# Patient Record
Sex: MALE | Race: WHITE | NOT HISPANIC OR LATINO | Employment: UNEMPLOYED | URBAN - METROPOLITAN AREA
[De-identification: names, ages, dates, MRNs, and addresses within clinical notes are randomized per-mention and may not be internally consistent; named-entity substitution may affect disease eponyms.]

---

## 2023-05-10 ENCOUNTER — HOSPITAL ENCOUNTER (INPATIENT)
Facility: MEDICAL CENTER | Age: 53
LOS: 1 days | DRG: 378 | End: 2023-05-11
Attending: STUDENT IN AN ORGANIZED HEALTH CARE EDUCATION/TRAINING PROGRAM | Admitting: STUDENT IN AN ORGANIZED HEALTH CARE EDUCATION/TRAINING PROGRAM
Payer: COMMERCIAL

## 2023-05-10 DIAGNOSIS — F10.10 ALCOHOL ABUSE: ICD-10-CM

## 2023-05-10 DIAGNOSIS — K29.61 EROSIVE GASTRITIS WITH HEMORRHAGE: ICD-10-CM

## 2023-05-10 DIAGNOSIS — K92.2 UPPER GI BLEED: ICD-10-CM

## 2023-05-10 DIAGNOSIS — K92.0 HEMATEMESIS, UNSPECIFIED WHETHER NAUSEA PRESENT: ICD-10-CM

## 2023-05-10 LAB
ALBUMIN SERPL BCP-MCNC: 3.9 G/DL (ref 3.2–4.9)
ALBUMIN/GLOB SERPL: 1.7 G/DL
ALP SERPL-CCNC: 65 U/L (ref 30–99)
ALT SERPL-CCNC: 16 U/L (ref 2–50)
ANION GAP SERPL CALC-SCNC: 10 MMOL/L (ref 7–16)
APPEARANCE UR: CLEAR
AST SERPL-CCNC: 14 U/L (ref 12–45)
BASOPHILS # BLD AUTO: 0.3 % (ref 0–1.8)
BASOPHILS # BLD: 0.03 K/UL (ref 0–0.12)
BILIRUB SERPL-MCNC: 0.6 MG/DL (ref 0.1–1.5)
BILIRUB UR QL STRIP.AUTO: NEGATIVE
BUN SERPL-MCNC: 27 MG/DL (ref 8–22)
CALCIUM ALBUM COR SERPL-MCNC: 8.7 MG/DL (ref 8.5–10.5)
CALCIUM SERPL-MCNC: 8.6 MG/DL (ref 8.5–10.5)
CHLORIDE SERPL-SCNC: 111 MMOL/L (ref 96–112)
CO2 SERPL-SCNC: 20 MMOL/L (ref 20–33)
COLOR UR: YELLOW
CREAT SERPL-MCNC: 0.79 MG/DL (ref 0.5–1.4)
EOSINOPHIL # BLD AUTO: 0.04 K/UL (ref 0–0.51)
EOSINOPHIL NFR BLD: 0.3 % (ref 0–6.9)
ERYTHROCYTE [DISTWIDTH] IN BLOOD BY AUTOMATED COUNT: 49.3 FL (ref 35.9–50)
GFR SERPLBLD CREATININE-BSD FMLA CKD-EPI: 106 ML/MIN/1.73 M 2
GLOBULIN SER CALC-MCNC: 2.3 G/DL (ref 1.9–3.5)
GLUCOSE SERPL-MCNC: 108 MG/DL (ref 65–99)
GLUCOSE UR STRIP.AUTO-MCNC: NEGATIVE MG/DL
HCT VFR BLD AUTO: 45.7 % (ref 42–52)
HGB BLD-MCNC: 15.4 G/DL (ref 14–18)
IMM GRANULOCYTES # BLD AUTO: 0.05 K/UL (ref 0–0.11)
IMM GRANULOCYTES NFR BLD AUTO: 0.4 % (ref 0–0.9)
INR PPP: 1.1 (ref 0.87–1.13)
KETONES UR STRIP.AUTO-MCNC: 15 MG/DL
LACTATE SERPL-SCNC: 1.8 MMOL/L (ref 0.5–2)
LEUKOCYTE ESTERASE UR QL STRIP.AUTO: NEGATIVE
LYMPHOCYTES # BLD AUTO: 3.2 K/UL (ref 1–4.8)
LYMPHOCYTES NFR BLD: 27.1 % (ref 22–41)
MCH RBC QN AUTO: 32.7 PG (ref 27–33)
MCHC RBC AUTO-ENTMCNC: 33.7 G/DL (ref 33.7–35.3)
MCV RBC AUTO: 97 FL (ref 81.4–97.8)
MICRO URNS: ABNORMAL
MONOCYTES # BLD AUTO: 0.67 K/UL (ref 0–0.85)
MONOCYTES NFR BLD AUTO: 5.7 % (ref 0–13.4)
NEUTROPHILS # BLD AUTO: 7.82 K/UL (ref 1.82–7.42)
NEUTROPHILS NFR BLD: 66.2 % (ref 44–72)
NITRITE UR QL STRIP.AUTO: NEGATIVE
NRBC # BLD AUTO: 0 K/UL
NRBC BLD-RTO: 0 /100 WBC
PH UR STRIP.AUTO: 5.5 [PH] (ref 5–8)
PLATELET # BLD AUTO: 162 K/UL (ref 164–446)
PMV BLD AUTO: 9.8 FL (ref 9–12.9)
POTASSIUM SERPL-SCNC: 5.3 MMOL/L (ref 3.6–5.5)
PROT SERPL-MCNC: 6.2 G/DL (ref 6–8.2)
PROT UR QL STRIP: NEGATIVE MG/DL
PROTHROMBIN TIME: 14.1 SEC (ref 12–14.6)
RBC # BLD AUTO: 4.71 M/UL (ref 4.7–6.1)
RBC UR QL AUTO: NEGATIVE
SODIUM SERPL-SCNC: 141 MMOL/L (ref 135–145)
SP GR UR STRIP.AUTO: 1.02
TROPONIN T SERPL-MCNC: 8 NG/L (ref 6–19)
UROBILINOGEN UR STRIP.AUTO-MCNC: 0.2 MG/DL
WBC # BLD AUTO: 11.8 K/UL (ref 4.8–10.8)

## 2023-05-10 PROCEDURE — 700111 HCHG RX REV CODE 636 W/ 250 OVERRIDE (IP): Performed by: STUDENT IN AN ORGANIZED HEALTH CARE EDUCATION/TRAINING PROGRAM

## 2023-05-10 PROCEDURE — 99285 EMERGENCY DEPT VISIT HI MDM: CPT

## 2023-05-10 PROCEDURE — 99223 1ST HOSP IP/OBS HIGH 75: CPT | Performed by: STUDENT IN AN ORGANIZED HEALTH CARE EDUCATION/TRAINING PROGRAM

## 2023-05-10 PROCEDURE — 96366 THER/PROPH/DIAG IV INF ADDON: CPT

## 2023-05-10 PROCEDURE — 96368 THER/DIAG CONCURRENT INF: CPT

## 2023-05-10 PROCEDURE — 36415 COLL VENOUS BLD VENIPUNCTURE: CPT

## 2023-05-10 PROCEDURE — 96365 THER/PROPH/DIAG IV INF INIT: CPT

## 2023-05-10 PROCEDURE — 770020 HCHG ROOM/CARE - TELE (206)

## 2023-05-10 PROCEDURE — 85025 COMPLETE CBC W/AUTO DIFF WBC: CPT

## 2023-05-10 PROCEDURE — 80053 COMPREHEN METABOLIC PANEL: CPT

## 2023-05-10 PROCEDURE — 84484 ASSAY OF TROPONIN QUANT: CPT

## 2023-05-10 PROCEDURE — 700105 HCHG RX REV CODE 258: Performed by: STUDENT IN AN ORGANIZED HEALTH CARE EDUCATION/TRAINING PROGRAM

## 2023-05-10 PROCEDURE — C9113 INJ PANTOPRAZOLE SODIUM, VIA: HCPCS | Performed by: STUDENT IN AN ORGANIZED HEALTH CARE EDUCATION/TRAINING PROGRAM

## 2023-05-10 PROCEDURE — 85610 PROTHROMBIN TIME: CPT

## 2023-05-10 PROCEDURE — 81003 URINALYSIS AUTO W/O SCOPE: CPT

## 2023-05-10 PROCEDURE — 83605 ASSAY OF LACTIC ACID: CPT

## 2023-05-10 RX ORDER — ONDANSETRON 4 MG/1
4 TABLET, ORALLY DISINTEGRATING ORAL EVERY 4 HOURS PRN
Status: DISCONTINUED | OUTPATIENT
Start: 2023-05-10 | End: 2023-05-11 | Stop reason: HOSPADM

## 2023-05-10 RX ORDER — PROCHLORPERAZINE EDISYLATE 5 MG/ML
5-10 INJECTION INTRAMUSCULAR; INTRAVENOUS EVERY 4 HOURS PRN
Status: DISCONTINUED | OUTPATIENT
Start: 2023-05-10 | End: 2023-05-11 | Stop reason: HOSPADM

## 2023-05-10 RX ORDER — ONDANSETRON 2 MG/ML
4 INJECTION INTRAMUSCULAR; INTRAVENOUS EVERY 4 HOURS PRN
Status: DISCONTINUED | OUTPATIENT
Start: 2023-05-10 | End: 2023-05-11 | Stop reason: HOSPADM

## 2023-05-10 RX ORDER — PROMETHAZINE HYDROCHLORIDE 25 MG/1
12.5-25 SUPPOSITORY RECTAL EVERY 4 HOURS PRN
Status: DISCONTINUED | OUTPATIENT
Start: 2023-05-10 | End: 2023-05-11 | Stop reason: HOSPADM

## 2023-05-10 RX ORDER — GAUZE BANDAGE 2" X 2"
100 BANDAGE TOPICAL DAILY
Status: DISCONTINUED | OUTPATIENT
Start: 2023-05-11 | End: 2023-05-11

## 2023-05-10 RX ORDER — PROMETHAZINE HYDROCHLORIDE 25 MG/1
12.5-25 TABLET ORAL EVERY 4 HOURS PRN
Status: DISCONTINUED | OUTPATIENT
Start: 2023-05-10 | End: 2023-05-11 | Stop reason: HOSPADM

## 2023-05-10 RX ORDER — FOLIC ACID 1 MG/1
1 TABLET ORAL DAILY
Status: DISCONTINUED | OUTPATIENT
Start: 2023-05-11 | End: 2023-05-11

## 2023-05-10 RX ADMIN — PANTOPRAZOLE SODIUM 8 MG/HR: 40 INJECTION, POWDER, FOR SOLUTION INTRAVENOUS at 22:23

## 2023-05-10 RX ADMIN — OCTREOTIDE ACETATE 50 MCG/HR: 200 INJECTION, SOLUTION INTRAVENOUS; SUBCUTANEOUS at 22:26

## 2023-05-11 ENCOUNTER — ANESTHESIA EVENT (OUTPATIENT)
Dept: SURGERY | Facility: MEDICAL CENTER | Age: 53
DRG: 378 | End: 2023-05-11
Payer: COMMERCIAL

## 2023-05-11 ENCOUNTER — PHARMACY VISIT (OUTPATIENT)
Dept: PHARMACY | Facility: MEDICAL CENTER | Age: 53
End: 2023-05-11
Payer: COMMERCIAL

## 2023-05-11 ENCOUNTER — ANESTHESIA (OUTPATIENT)
Dept: SURGERY | Facility: MEDICAL CENTER | Age: 53
DRG: 378 | End: 2023-05-11
Payer: COMMERCIAL

## 2023-05-11 VITALS
HEART RATE: 102 BPM | WEIGHT: 223.55 LBS | DIASTOLIC BLOOD PRESSURE: 70 MMHG | RESPIRATION RATE: 19 BRPM | SYSTOLIC BLOOD PRESSURE: 104 MMHG | HEIGHT: 71 IN | BODY MASS INDEX: 31.3 KG/M2 | TEMPERATURE: 98.2 F | OXYGEN SATURATION: 94 %

## 2023-05-11 LAB
CFT BLD TEG: 5.3 MIN (ref 4.6–9.1)
CFT P HPASE BLD TEG: 7.1 MIN (ref 4.3–8.3)
CLOT ANGLE BLD TEG: 61.8 DEGREES (ref 63–78)
CT.EXTRINSIC BLD ROTEM: 2.3 MIN (ref 0.8–2.1)
HGB BLD-MCNC: 14.7 G/DL (ref 14–18)
HGB BLD-MCNC: 15.3 G/DL (ref 14–18)
MAGNESIUM SERPL-MCNC: 2 MG/DL (ref 1.5–2.5)
MCF BLD TEG: 51 MM (ref 52–69)
MCF.PLATELET INHIB BLD ROTEM: 16.7 MM (ref 15–32)
PHOSPHATE SERPL-MCNC: 3.1 MG/DL (ref 2.5–4.5)
TEG ALGORITHM TGALG: ABNORMAL

## 2023-05-11 PROCEDURE — 700111 HCHG RX REV CODE 636 W/ 250 OVERRIDE (IP): Performed by: STUDENT IN AN ORGANIZED HEALTH CARE EDUCATION/TRAINING PROGRAM

## 2023-05-11 PROCEDURE — 99239 HOSP IP/OBS DSCHRG MGMT >30: CPT | Performed by: STUDENT IN AN ORGANIZED HEALTH CARE EDUCATION/TRAINING PROGRAM

## 2023-05-11 PROCEDURE — 700101 HCHG RX REV CODE 250: Performed by: STUDENT IN AN ORGANIZED HEALTH CARE EDUCATION/TRAINING PROGRAM

## 2023-05-11 PROCEDURE — 160048 HCHG OR STATISTICAL LEVEL 1-5: Performed by: INTERNAL MEDICINE

## 2023-05-11 PROCEDURE — 85347 COAGULATION TIME ACTIVATED: CPT

## 2023-05-11 PROCEDURE — 36415 COLL VENOUS BLD VENIPUNCTURE: CPT

## 2023-05-11 PROCEDURE — 160035 HCHG PACU - 1ST 60 MINS PHASE I: Performed by: INTERNAL MEDICINE

## 2023-05-11 PROCEDURE — 85018 HEMOGLOBIN: CPT

## 2023-05-11 PROCEDURE — 43235 EGD DIAGNOSTIC BRUSH WASH: CPT | Performed by: INTERNAL MEDICINE

## 2023-05-11 PROCEDURE — 700105 HCHG RX REV CODE 258: Performed by: STUDENT IN AN ORGANIZED HEALTH CARE EDUCATION/TRAINING PROGRAM

## 2023-05-11 PROCEDURE — 00731 ANES UPR GI NDSC PX NOS: CPT | Performed by: ANESTHESIOLOGY

## 2023-05-11 PROCEDURE — 160002 HCHG RECOVERY MINUTES (STAT): Performed by: INTERNAL MEDICINE

## 2023-05-11 PROCEDURE — 700105 HCHG RX REV CODE 258: Performed by: INTERNAL MEDICINE

## 2023-05-11 PROCEDURE — 160202 HCHG ENDO MINUTES - 1ST 30 MINS LEVEL 3: Performed by: INTERNAL MEDICINE

## 2023-05-11 PROCEDURE — A9270 NON-COVERED ITEM OR SERVICE: HCPCS | Performed by: STUDENT IN AN ORGANIZED HEALTH CARE EDUCATION/TRAINING PROGRAM

## 2023-05-11 PROCEDURE — 99223 1ST HOSP IP/OBS HIGH 75: CPT | Performed by: NURSE PRACTITIONER

## 2023-05-11 PROCEDURE — 700105 HCHG RX REV CODE 258: Performed by: ANESTHESIOLOGY

## 2023-05-11 PROCEDURE — 85576 BLOOD PLATELET AGGREGATION: CPT

## 2023-05-11 PROCEDURE — 0DJ08ZZ INSPECTION OF UPPER INTESTINAL TRACT, VIA NATURAL OR ARTIFICIAL OPENING ENDOSCOPIC: ICD-10-PCS | Performed by: INTERNAL MEDICINE

## 2023-05-11 PROCEDURE — 83735 ASSAY OF MAGNESIUM: CPT

## 2023-05-11 PROCEDURE — 84100 ASSAY OF PHOSPHORUS: CPT

## 2023-05-11 PROCEDURE — 700111 HCHG RX REV CODE 636 W/ 250 OVERRIDE (IP): Performed by: ANESTHESIOLOGY

## 2023-05-11 PROCEDURE — 96366 THER/PROPH/DIAG IV INF ADDON: CPT

## 2023-05-11 PROCEDURE — 700102 HCHG RX REV CODE 250 W/ 637 OVERRIDE(OP): Performed by: STUDENT IN AN ORGANIZED HEALTH CARE EDUCATION/TRAINING PROGRAM

## 2023-05-11 PROCEDURE — RXMED WILLOW AMBULATORY MEDICATION CHARGE: Performed by: STUDENT IN AN ORGANIZED HEALTH CARE EDUCATION/TRAINING PROGRAM

## 2023-05-11 PROCEDURE — 85384 FIBRINOGEN ACTIVITY: CPT

## 2023-05-11 PROCEDURE — C9113 INJ PANTOPRAZOLE SODIUM, VIA: HCPCS | Performed by: STUDENT IN AN ORGANIZED HEALTH CARE EDUCATION/TRAINING PROGRAM

## 2023-05-11 PROCEDURE — 160009 HCHG ANES TIME/MIN: Performed by: INTERNAL MEDICINE

## 2023-05-11 RX ORDER — HYDROMORPHONE HYDROCHLORIDE 1 MG/ML
0.1 INJECTION, SOLUTION INTRAMUSCULAR; INTRAVENOUS; SUBCUTANEOUS
Status: DISCONTINUED | OUTPATIENT
Start: 2023-05-11 | End: 2023-05-11 | Stop reason: HOSPADM

## 2023-05-11 RX ORDER — LORAZEPAM 2 MG/ML
1 INJECTION INTRAMUSCULAR
Status: DISCONTINUED | OUTPATIENT
Start: 2023-05-11 | End: 2023-05-11 | Stop reason: HOSPADM

## 2023-05-11 RX ORDER — LORAZEPAM 1 MG/1
3 TABLET ORAL
Status: DISCONTINUED | OUTPATIENT
Start: 2023-05-11 | End: 2023-05-11 | Stop reason: HOSPADM

## 2023-05-11 RX ORDER — EPHEDRINE SULFATE 50 MG/ML
5 INJECTION, SOLUTION INTRAVENOUS
Status: DISCONTINUED | OUTPATIENT
Start: 2023-05-11 | End: 2023-05-11 | Stop reason: HOSPADM

## 2023-05-11 RX ORDER — LIDOCAINE HYDROCHLORIDE 10 MG/ML
INJECTION, SOLUTION EPIDURAL; INFILTRATION; INTRACAUDAL; PERINEURAL PRN
Status: DISCONTINUED | OUTPATIENT
Start: 2023-05-11 | End: 2023-05-11 | Stop reason: SURG

## 2023-05-11 RX ORDER — SODIUM CHLORIDE, SODIUM LACTATE, POTASSIUM CHLORIDE, CALCIUM CHLORIDE 600; 310; 30; 20 MG/100ML; MG/100ML; MG/100ML; MG/100ML
INJECTION, SOLUTION INTRAVENOUS CONTINUOUS
Status: ACTIVE | OUTPATIENT
Start: 2023-05-11 | End: 2023-05-11

## 2023-05-11 RX ORDER — GAUZE BANDAGE 2" X 2"
100 BANDAGE TOPICAL DAILY
Status: DISCONTINUED | OUTPATIENT
Start: 2023-05-12 | End: 2023-05-11 | Stop reason: HOSPADM

## 2023-05-11 RX ORDER — LORAZEPAM 1 MG/1
1 TABLET ORAL EVERY 4 HOURS PRN
Status: DISCONTINUED | OUTPATIENT
Start: 2023-05-11 | End: 2023-05-11 | Stop reason: HOSPADM

## 2023-05-11 RX ORDER — LORAZEPAM 1 MG/1
4 TABLET ORAL
Status: DISCONTINUED | OUTPATIENT
Start: 2023-05-11 | End: 2023-05-11 | Stop reason: HOSPADM

## 2023-05-11 RX ORDER — LORAZEPAM 2 MG/ML
2 INJECTION INTRAMUSCULAR
Status: DISCONTINUED | OUTPATIENT
Start: 2023-05-11 | End: 2023-05-11 | Stop reason: HOSPADM

## 2023-05-11 RX ORDER — OMEPRAZOLE 20 MG/1
20 CAPSULE, DELAYED RELEASE ORAL 2 TIMES DAILY
Qty: 120 CAPSULE | Refills: 0 | Status: SHIPPED | OUTPATIENT
Start: 2023-05-12 | End: 2023-07-11

## 2023-05-11 RX ORDER — HYDROMORPHONE HYDROCHLORIDE 1 MG/ML
0.2 INJECTION, SOLUTION INTRAMUSCULAR; INTRAVENOUS; SUBCUTANEOUS
Status: DISCONTINUED | OUTPATIENT
Start: 2023-05-11 | End: 2023-05-11 | Stop reason: HOSPADM

## 2023-05-11 RX ORDER — FOLIC ACID 1 MG/1
1 TABLET ORAL DAILY
Status: DISCONTINUED | OUTPATIENT
Start: 2023-05-12 | End: 2023-05-11 | Stop reason: HOSPADM

## 2023-05-11 RX ORDER — LORAZEPAM 2 MG/ML
1.5 INJECTION INTRAMUSCULAR
Status: DISCONTINUED | OUTPATIENT
Start: 2023-05-11 | End: 2023-05-11 | Stop reason: HOSPADM

## 2023-05-11 RX ORDER — LORAZEPAM 2 MG/ML
1 INJECTION INTRAMUSCULAR
Status: DISCONTINUED | OUTPATIENT
Start: 2023-05-11 | End: 2023-05-11

## 2023-05-11 RX ORDER — LORAZEPAM 2 MG/ML
0.5 INJECTION INTRAMUSCULAR EVERY 4 HOURS PRN
Status: DISCONTINUED | OUTPATIENT
Start: 2023-05-11 | End: 2023-05-11 | Stop reason: HOSPADM

## 2023-05-11 RX ORDER — HYDROMORPHONE HYDROCHLORIDE 1 MG/ML
0.4 INJECTION, SOLUTION INTRAMUSCULAR; INTRAVENOUS; SUBCUTANEOUS
Status: DISCONTINUED | OUTPATIENT
Start: 2023-05-11 | End: 2023-05-11 | Stop reason: HOSPADM

## 2023-05-11 RX ORDER — LORAZEPAM 1 MG/1
0.5 TABLET ORAL EVERY 4 HOURS PRN
Status: DISCONTINUED | OUTPATIENT
Start: 2023-05-11 | End: 2023-05-11 | Stop reason: HOSPADM

## 2023-05-11 RX ORDER — SODIUM CHLORIDE, SODIUM LACTATE, POTASSIUM CHLORIDE, CALCIUM CHLORIDE 600; 310; 30; 20 MG/100ML; MG/100ML; MG/100ML; MG/100ML
INJECTION, SOLUTION INTRAVENOUS CONTINUOUS
Status: DISCONTINUED | OUTPATIENT
Start: 2023-05-11 | End: 2023-05-11 | Stop reason: HOSPADM

## 2023-05-11 RX ORDER — ONDANSETRON 2 MG/ML
4 INJECTION INTRAMUSCULAR; INTRAVENOUS
Status: DISCONTINUED | OUTPATIENT
Start: 2023-05-11 | End: 2023-05-11 | Stop reason: HOSPADM

## 2023-05-11 RX ORDER — OMEPRAZOLE 20 MG/1
20 CAPSULE, DELAYED RELEASE ORAL 2 TIMES DAILY
Status: DISCONTINUED | OUTPATIENT
Start: 2023-05-11 | End: 2023-05-11 | Stop reason: HOSPADM

## 2023-05-11 RX ORDER — SODIUM CHLORIDE, SODIUM LACTATE, POTASSIUM CHLORIDE, CALCIUM CHLORIDE 600; 310; 30; 20 MG/100ML; MG/100ML; MG/100ML; MG/100ML
INJECTION, SOLUTION INTRAVENOUS
Status: DISCONTINUED | OUTPATIENT
Start: 2023-05-11 | End: 2023-05-11 | Stop reason: SURG

## 2023-05-11 RX ORDER — DIPHENHYDRAMINE HYDROCHLORIDE 50 MG/ML
12.5 INJECTION INTRAMUSCULAR; INTRAVENOUS
Status: DISCONTINUED | OUTPATIENT
Start: 2023-05-11 | End: 2023-05-11 | Stop reason: HOSPADM

## 2023-05-11 RX ORDER — LORAZEPAM 1 MG/1
2 TABLET ORAL
Status: DISCONTINUED | OUTPATIENT
Start: 2023-05-11 | End: 2023-05-11 | Stop reason: HOSPADM

## 2023-05-11 RX ORDER — HYDRALAZINE HYDROCHLORIDE 20 MG/ML
5 INJECTION INTRAMUSCULAR; INTRAVENOUS
Status: DISCONTINUED | OUTPATIENT
Start: 2023-05-11 | End: 2023-05-11 | Stop reason: HOSPADM

## 2023-05-11 RX ORDER — MEPERIDINE HYDROCHLORIDE 25 MG/ML
6.25 INJECTION INTRAMUSCULAR; INTRAVENOUS; SUBCUTANEOUS
Status: DISCONTINUED | OUTPATIENT
Start: 2023-05-11 | End: 2023-05-11 | Stop reason: HOSPADM

## 2023-05-11 RX ORDER — LABETALOL HYDROCHLORIDE 5 MG/ML
5 INJECTION, SOLUTION INTRAVENOUS
Status: DISCONTINUED | OUTPATIENT
Start: 2023-05-11 | End: 2023-05-11 | Stop reason: HOSPADM

## 2023-05-11 RX ORDER — ATORVASTATIN CALCIUM 20 MG/1
20 TABLET, FILM COATED ORAL
Status: ON HOLD | COMMUNITY
End: 2023-05-11

## 2023-05-11 RX ORDER — OXYCODONE HCL 5 MG/5 ML
10 SOLUTION, ORAL ORAL
Status: DISCONTINUED | OUTPATIENT
Start: 2023-05-11 | End: 2023-05-11 | Stop reason: HOSPADM

## 2023-05-11 RX ORDER — OXYCODONE HCL 5 MG/5 ML
5 SOLUTION, ORAL ORAL
Status: DISCONTINUED | OUTPATIENT
Start: 2023-05-11 | End: 2023-05-11 | Stop reason: HOSPADM

## 2023-05-11 RX ADMIN — OMEPRAZOLE 20 MG: 20 CAPSULE, DELAYED RELEASE ORAL at 17:04

## 2023-05-11 RX ADMIN — PANTOPRAZOLE SODIUM 8 MG/HR: 40 INJECTION, POWDER, FOR SOLUTION INTRAVENOUS at 08:46

## 2023-05-11 RX ADMIN — PROPOFOL 20 MG: 10 INJECTION, EMULSION INTRAVENOUS at 10:02

## 2023-05-11 RX ADMIN — THIAMINE HYDROCHLORIDE: 100 INJECTION, SOLUTION INTRAMUSCULAR; INTRAVENOUS at 15:13

## 2023-05-11 RX ADMIN — PROPOFOL 20 MG: 10 INJECTION, EMULSION INTRAVENOUS at 10:03

## 2023-05-11 RX ADMIN — PROPOFOL 20 MG: 10 INJECTION, EMULSION INTRAVENOUS at 10:01

## 2023-05-11 RX ADMIN — PROPOFOL 100 MG: 10 INJECTION, EMULSION INTRAVENOUS at 10:00

## 2023-05-11 RX ADMIN — SODIUM CHLORIDE, POTASSIUM CHLORIDE, SODIUM LACTATE AND CALCIUM CHLORIDE: 600; 310; 30; 20 INJECTION, SOLUTION INTRAVENOUS at 09:28

## 2023-05-11 RX ADMIN — LIDOCAINE HYDROCHLORIDE 50 MG: 10 INJECTION, SOLUTION EPIDURAL; INFILTRATION; INTRACAUDAL at 10:00

## 2023-05-11 RX ADMIN — FENTANYL CITRATE 25 MCG: 50 INJECTION, SOLUTION INTRAMUSCULAR; INTRAVENOUS at 09:57

## 2023-05-11 RX ADMIN — SODIUM CHLORIDE, POTASSIUM CHLORIDE, SODIUM LACTATE AND CALCIUM CHLORIDE: 600; 310; 30; 20 INJECTION, SOLUTION INTRAVENOUS at 09:55

## 2023-05-11 ASSESSMENT — FIBROSIS 4 INDEX: FIB4 SCORE: 1.15

## 2023-05-11 ASSESSMENT — COGNITIVE AND FUNCTIONAL STATUS - GENERAL
SUGGESTED CMS G CODE MODIFIER DAILY ACTIVITY: CH
DAILY ACTIVITIY SCORE: 24
SUGGESTED CMS G CODE MODIFIER MOBILITY: CH
MOBILITY SCORE: 24

## 2023-05-11 ASSESSMENT — ENCOUNTER SYMPTOMS
NAUSEA: 1
SHORTNESS OF BREATH: 0
HEARTBURN: 0
FEVER: 0
BRUISES/BLEEDS EASILY: 0
WEAKNESS: 0
ABDOMINAL PAIN: 0
BLOOD IN STOOL: 0
CONSTIPATION: 0
COUGH: 0
DEPRESSION: 0
DOUBLE VISION: 0
CHILLS: 0
DIARRHEA: 0
NAUSEA: 0
BLURRED VISION: 0
MYALGIAS: 0
HEMOPTYSIS: 0
PALPITATIONS: 0
NECK PAIN: 0
BACK PAIN: 0
VOMITING: 1
ABDOMINAL PAIN: 1
DIZZINESS: 0
HEADACHES: 0

## 2023-05-11 ASSESSMENT — PAIN DESCRIPTION - PAIN TYPE
TYPE: SURGICAL PAIN

## 2023-05-11 ASSESSMENT — LIFESTYLE VARIABLES
HAVE PEOPLE ANNOYED YOU BY CRITICIZING YOUR DRINKING: YES
EVER FELT BAD OR GUILTY ABOUT YOUR DRINKING: NO
HEADACHE, FULLNESS IN HEAD: NOT PRESENT
TOTAL SCORE: 3
TOTAL SCORE: 3
ANXIETY: MILDLY ANXIOUS
HOW MANY TIMES IN THE PAST YEAR HAVE YOU HAD 5 OR MORE DRINKS IN A DAY: 20
VISUAL DISTURBANCES: NOT PRESENT
ALCOHOL_USE: YES
AUDITORY DISTURBANCES: NOT PRESENT
HAVE YOU EVER FELT YOU SHOULD CUT DOWN ON YOUR DRINKING: YES
SUBSTANCE_ABUSE: 1
PAROXYSMAL SWEATS: NO SWEAT VISIBLE
EVER HAD A DRINK FIRST THING IN THE MORNING TO STEADY YOUR NERVES TO GET RID OF A HANGOVER: YES
AVERAGE NUMBER OF DAYS PER WEEK YOU HAVE A DRINK CONTAINING ALCOHOL: 7
TOTAL SCORE: 1
CONSUMPTION TOTAL: POSITIVE
AGITATION: NORMAL ACTIVITY
ORIENTATION AND CLOUDING OF SENSORIUM: ORIENTED AND CAN DO SERIAL ADDITIONS
DOES PATIENT WANT TO STOP DRINKING: NO
NAUSEA AND VOMITING: NO NAUSEA AND NO VOMITING
TREMOR: NO TREMOR
TOTAL SCORE: 3
ON A TYPICAL DAY WHEN YOU DRINK ALCOHOL HOW MANY DRINKS DO YOU HAVE: 3

## 2023-05-11 ASSESSMENT — PATIENT HEALTH QUESTIONNAIRE - PHQ9
1. LITTLE INTEREST OR PLEASURE IN DOING THINGS: NOT AT ALL
SUM OF ALL RESPONSES TO PHQ9 QUESTIONS 1 AND 2: 0
2. FEELING DOWN, DEPRESSED, IRRITABLE, OR HOPELESS: NOT AT ALL

## 2023-05-11 NOTE — ANESTHESIA PREPROCEDURE EVALUATION
Case: [id number] Date/Time: 05/11/23 1346    Procedure: GASTROSCOPY    Location: [facility] ROOM 26 / SURGERY SAME DAY [facility]    Surgeons: Kee Amin M.D.          Relevant Problems   Other   (positive) Alcohol abuse   (positive) Obesity (BMI 30.0-34.9)   (positive) Upper GI bleed     Last hematemesis yesterday evening.  Physical Exam    Airway   Mallampati: III  TM distance: >3 FB  Neck ROM: full       Cardiovascular - normal exam  Rhythm: regular  Rate: normal  (-) murmur     Dental - normal exam           Pulmonary - normal exam  Breath sounds clear to auscultation     Abdominal    Neurological - normal exam                 Anesthesia Plan    ASA 2       Plan - MAC         (GETA if necessary)      Induction: intravenous      Pertinent diagnostic labs and testing reviewed    Informed Consent:    Anesthetic plan and risks discussed with patient.    Use of blood products discussed with: patient whom consented to blood products.

## 2023-05-11 NOTE — PROGRESS NOTES
Hospital Medicine Daily Progress Note    Date of Service  5/11/2023    Chief Complaint  Sonny Fitch is a 52 y.o. male admitted 5/10/2023 with GI bleed    Hospital Course  Sonny Fitch is a 52 y.o. male past medical history of lower back pain, alcohol abuse who presented 5/10/2023 with vomiting blood at outside facility and episode of black tarry stool that he noticed this morning.  Patient reports he drinks half a pint of Captrenny Nielsen every day.  He reports never having bloody vomiting in the past.  He does report using ibuprofen 2-3 times weekly for chronic lower back pain as opiates make him itchy.  At outside facility he was given 2 units of PRBCs, started on Protonix and octreotide.  Patient transferred here for higher level of care.     In the ER at [facility] his blood pressure remained hemodynamically stable.  Repeat hemoglobin returned at 15.  Currently no further episodes of hematemesis or black tarry stools.  GI was consulted by ER physician recommended endoscopy in a.m.  He will be admitted to medicine service.    Interval Problem Update  5/11/2023:  Patient underwent upper endoscopy today findings were significant for LA grade B esophagitis at the GE junction no esophageal varices however.  Furthermore, patient also had diffuse erythema, erythema and also erosive changes with old blood in the stomach this was highly suggestive of erosive gastritis.  This bleed possibly secondary to chronic alcohol use.  There is no active bleeding no hemostasis was need to be performed.  Recommendations were appreciated.  Patient will continue on oral PPI twice daily for 60 days.  Additionally patient will be placed in  regards to alcohol cessation.  Furthermore there is strong suspicion that patient may be going through early stages of active alcohol withdrawal CIWA protocol has been ordered.  Repeat labs in the morning continue to trend hemoglobin of the course the evening.    I  have discussed this patient's plan of care and discharge plan at IDT rounds today with Case Management, Nursing, Nursing leadership, and other members of the IDT team.    Consultants/Specialty  GI    Code Status  Full Code    Disposition  The patient is not medically cleared for discharge to home or a post-acute facility.      I have placed the appropriate orders for post-discharge needs.    Review of Systems  Review of Systems   Constitutional:  Negative for chills and fever.   HENT:  Negative for hearing loss and tinnitus.    Eyes:  Negative for blurred vision and double vision.   Respiratory:  Negative for cough and hemoptysis.    Cardiovascular:  Negative for chest pain and palpitations.   Gastrointestinal:  Positive for abdominal pain, nausea and vomiting. Negative for heartburn.   Genitourinary:  Negative for dysuria and urgency.   Musculoskeletal:  Negative for myalgias and neck pain.   Skin:  Negative for rash.   Neurological:  Negative for dizziness and headaches.   Endo/Heme/Allergies:  Does not bruise/bleed easily.   Psychiatric/Behavioral:  Negative for depression and suicidal ideas.         Physical Exam  Temp:  [36.6 °C (97.8 °F)-36.8 °C (98.3 °F)] 36.6 °C (97.8 °F)  Pulse:  [] 88  Resp:  [11-26] 16  BP: ()/(51-72) 100/60  SpO2:  [90 %-98 %] 98 %    Physical Exam  Vitals and nursing note reviewed.   Constitutional:       Appearance: Normal appearance.   HENT:      Head: Normocephalic and atraumatic.      Right Ear: Tympanic membrane normal.      Left Ear: Tympanic membrane normal.      Nose: Nose normal.      Mouth/Throat:      Mouth: Mucous membranes are dry.      Pharynx: Oropharynx is clear.   Eyes:      Extraocular Movements: Extraocular movements intact.      Pupils: Pupils are equal, round, and reactive to light.   Cardiovascular:      Rate and Rhythm: Normal rate and regular rhythm.      Pulses: Normal pulses.      Heart sounds: Normal heart sounds.   Pulmonary:      Effort:  Pulmonary effort is normal.      Breath sounds: Normal breath sounds.   Abdominal:      General: Bowel sounds are normal. There is no distension.      Palpations: Abdomen is soft. There is no mass.   Musculoskeletal:         General: Normal range of motion.      Cervical back: Neck supple.   Skin:     General: Skin is warm.      Capillary Refill: Capillary refill takes less than 2 seconds.   Neurological:      General: No focal deficit present.      Mental Status: He is alert and oriented to person, place, and time. Mental status is at baseline.   Psychiatric:         Mood and Affect: Mood normal.         Behavior: Behavior normal.         Fluids    Intake/Output Summary (Last 24 hours) at 5/11/2023 1259  Last data filed at 5/11/2023 1010  Gross per 24 hour   Intake 200 ml   Output --   Net 200 ml       Laboratory  Recent Labs     05/10/23  2147 05/11/23  0521   WBC 11.8*  --    RBC 4.71  --    HEMOGLOBIN 15.4 14.7   HEMATOCRIT 45.7  --    MCV 97.0  --    MCH 32.7  --    MCHC 33.7  --    RDW 49.3  --    PLATELETCT 162*  --    MPV 9.8  --      Recent Labs     05/10/23  2147   SODIUM 141   POTASSIUM 5.3   CHLORIDE 111   CO2 20   GLUCOSE 108*   BUN 27*   CREATININE 0.79   CALCIUM 8.6     Recent Labs     05/10/23  2147   INR 1.10               Imaging  No orders to display        Assessment/Plan  * Upper GI bleed- (present on admission)  Assessment & Plan  Patient status post upper endoscopy evidence of erosive gastritis with evidence of old blood status post bleed in addition to distal esophagitis at GE junction.  No hemostasis was required no evidence of active bleeding.  Patient continue on omeprazole 20 mg twice daily for 60 days.  Alcohol cessation ibuprofen cessation ibuprofen     Erosive esophagitis  Assessment & Plan  No intervention required continue with alcohol cessation continue with omeprazole 20 mg twice daily for 6 weeks.    Erosive gastritis with hemorrhage  Assessment & Plan  Patient had evidence of  erosive gastritis in addition to diffuse erythema on the gastric surface however no evidence of active bleeding no hemostasis was required.  Evidence of old blood was present.  Duodenum complete normal.  Plan:  1.  Omeprazole 20 mg twice daily for 60 days  2.  Continue to avoid alcohol.    Obesity (BMI 30.0-34.9)  Assessment & Plan  BMI 33.47       Alcohol abuse  Assessment & Plan  Reports never being hospitalized for Etoh withdrawal   Check magnesium   Thiamine, folic acid and multivitamins        Please note that this dictation was created using voice recognition software. I have made every reasonable attempt to correct obvious errors, but I expect that there are errors of grammar and possibly context that I did not discover before finalizing the note.      Greater than 51 minutes spent prepping to see patient (e.g. review of tests) obtaining and/or reviewing separately obtained history. Performing a medically appropriate examination and/ evaluation.  Counseling and educating the patient/family/caregiver.  Ordering medications, tests, or procedures.  Referring and communicating with other health care professionals.  Documenting clinical information in EPIC.  Independently interpreting results and communicating results to patient/family/caregiver.  Care coordination.    VTE prophylaxis: SCDs/TEDs and pharmacologic prophylaxis contraindicated due to recent GI bleed    I have performed a physical exam and reviewed and updated ROS and Plan today (5/11/2023). In review of yesterday's note (5/10/2023), there are no changes except as documented above.

## 2023-05-11 NOTE — ED NOTES
"Unable to obtain med rec at this time    Patient stated he uses [facility], in [place]- however Im unable to locate a pharmacy by that name. Unable to confirm if he meant [facility].   Patient states he takes something for cholesterol, but he doesn't know what and kept repeating \"I dont know\" when I tried to ask further questions.     On interviewing patient, patient refused to talk to me about medications further, patient was aggravated and hostile demanding I find someone to go over his admission paperwork with him. When I stated I was not qualified to discuss his paperwork and would find someone for him, he started shaking it at me and insisting I find a supervisor and not the PARs who had previously assisted him.     I did find PAR Senior, Jones, and asked Jones to speak with him about his paperwork.   "

## 2023-05-11 NOTE — ED TRIAGE NOTES
"Chief Complaint   Patient presents with    Upper GI Bleed     Patient to [facility] via ems from [facility], diagnosed with upper gi bleed txa, 2PRBC, 80mg protonix, 1g rocephin. Patient presented to [place] for dizziness and 2 days blood in vomit. Patient was found to be hypotensive 60/40s and proceeded to vomit 1000mls lyssa red blood.      BP (!) 88/55   Pulse 93   Temp 36.8 °C (98.3 °F) (Temporal)   Resp 18   Ht 1.803 m (5' 11\")   Wt 109 kg (240 lb)   SpO2 96%     "

## 2023-05-11 NOTE — H&P
Hospital Medicine History & Physical Note    Date of Service  5/10/2023    Primary Care Physician  Pcp Pt States None    Consultants  GI    Specialist Names: Dr. Amin     Code Status  Full Code    Chief Complaint  Chief Complaint   Patient presents with    Upper GI Bleed     Patient to [facility] via ems from [facility], diagnosed with upper gi bleed txa, 2PRBC, 80mg protonix, 1g rocephin. Patient presented to [place] for dizziness and 2 days blood in vomit. Patient was found to be hypotensive 60/40s and proceeded to vomit 1000mls lyssa red blood.        History of Presenting Illness  Sonny Fitch is a 52 y.o. male past medical history of lower back pain, alcohol abuse who presented 5/10/2023 with vomiting blood at outside facility and episode of black tarry stool that he noticed this morning.  Patient reports he drinks half a pint of [employer] [place] every day.  He reports never having bloody vomiting in the past.  He does report using ibuprofen 2-3 times weekly for chronic lower back pain as opiates make him itchy.  At outside facility he was given 2 units of PRBCs, started on Protonix and octreotide.  Patient transferred here for higher level of care.    In the ER at [facility] his blood pressure remained hemodynamically stable.  Repeat hemoglobin returned at 15.  Currently no further episodes of hematemesis or black tarry stools.  GI was consulted by ER physician recommended endoscopy in a.m.  He will be admitted to medicine service.    I discussed the plan of care with patient, bedside RN, and ER physician. .    Review of Systems  Review of Systems   Constitutional:  Negative for chills and fever.   HENT:  Negative for hearing loss and tinnitus.    Eyes:  Negative for blurred vision and double vision.   Respiratory:  Negative for cough and hemoptysis.    Cardiovascular:  Negative for chest pain and palpitations.   Gastrointestinal:  Positive for abdominal pain, nausea and vomiting.  Negative for heartburn.   Genitourinary:  Negative for dysuria and urgency.   Musculoskeletal:  Negative for myalgias and neck pain.   Skin:  Negative for rash.   Neurological:  Negative for dizziness and headaches.   Endo/Heme/Allergies:  Does not bruise/bleed easily.   Psychiatric/Behavioral:  Negative for depression and suicidal ideas.        Past Medical History   has a past medical history of Hyperlipidemia.    Surgical History   has a past surgical history that includes irrigation & debridement ortho (7/14/08); external fixator application (7/14/08); and orif, fracture, tibia (7/15/08).     Family History  family history is not on file.   Family history reviewed with patient. There is no family history that is pertinent to the chief complaint.     Social History   reports that he has been smoking cigarettes. He has been smoking an average of 1 pack per day. He has never used smokeless tobacco. He reports current alcohol use of about 9.0 oz of alcohol per week. He reports that he does not use drugs.    Allergies  Allergies   Allergen Reactions    Morphine Itching    Opium Itching       Medications  None       Physical Exam  Temp:  [36.8 °C (98.3 °F)] 36.8 °C (98.3 °F)  Pulse:  [] 86  Resp:  [14-22] 20  BP: ()/(52-72) 91/52  SpO2:  [90 %-97 %] 91 %  Blood Pressure: 91/52   Temperature: 36.8 °C (98.3 °F)   Pulse: 86   Respiration: 20   Pulse Oximetry: 91 %       Physical Exam  Vitals and nursing note reviewed.   Constitutional:       Appearance: Normal appearance.   HENT:      Head: Normocephalic and atraumatic.      Right Ear: Tympanic membrane normal.      Left Ear: Tympanic membrane normal.      Nose: Nose normal.      Mouth/Throat:      Mouth: Mucous membranes are dry.      Pharynx: Oropharynx is clear.   Eyes:      Extraocular Movements: Extraocular movements intact.      Pupils: Pupils are equal, round, and reactive to light.   Cardiovascular:      Rate and Rhythm: Normal rate and regular  rhythm.      Pulses: Normal pulses.      Heart sounds: Normal heart sounds.   Pulmonary:      Effort: Pulmonary effort is normal.      Breath sounds: Normal breath sounds.   Abdominal:      General: Bowel sounds are normal. There is no distension.      Palpations: Abdomen is soft. There is no mass.   Musculoskeletal:         General: Normal range of motion.      Cervical back: Neck supple.   Skin:     General: Skin is warm.      Capillary Refill: Capillary refill takes less than 2 seconds.   Neurological:      General: No focal deficit present.      Mental Status: He is alert and oriented to person, place, and time. Mental status is at baseline.   Psychiatric:         Mood and Affect: Mood normal.         Behavior: Behavior normal.         Laboratory:  Recent Labs     05/10/23  2147 05/11/23  0521   WBC 11.8*  --    RBC 4.71  --    HEMOGLOBIN 15.4 14.7   HEMATOCRIT 45.7  --    MCV 97.0  --    MCH 32.7  --    MCHC 33.7  --    RDW 49.3  --    PLATELETCT 162*  --    MPV 9.8  --      Recent Labs     05/10/23  2147   SODIUM 141   POTASSIUM 5.3   CHLORIDE 111   CO2 20   GLUCOSE 108*   BUN 27*   CREATININE 0.79   CALCIUM 8.6     Recent Labs     05/10/23  2147   ALTSGPT 16   ASTSGOT 14   ALKPHOSPHAT 65   TBILIRUBIN 0.6   GLUCOSE 108*     Recent Labs     05/10/23  2147   INR 1.10     No results for input(s): NTPROBNP in the last 72 hours.      Recent Labs     05/10/23  2147   TROPONINT 8       Imaging:  No orders to display       no X-Ray or EKG requiring interpretation    Assessment/Plan:  Justification for Admission Status  I anticipate this patient will require at least two midnights for appropriate medical management, necessitating inpatient admission because upper GI bleed requiring Serial H/H, PPI, gtt and GI eval in am/    Patient will need a Telemetry bed on MEDICAL service .  The need is secondary to see above.    * Upper GI bleed- (present on admission)  Assessment & Plan  Uses ibuprofen combined with etoh highly  suspect Upper GI Bleed   Protonix and Octeotide, gtt  Serial H/H  He received 2 units of PRBC at outside facility  Transfuse < 7   Avoid NSAID's and Anticoagulants     GI consulted plan for Scope in am     Obesity (BMI 30.0-34.9)  Assessment & Plan  BMI 33.47       Alcohol abuse  Assessment & Plan  Reports never being hospitalized for Etoh withdrawal   Check magnesium   Thiamine, folic acid and multivitamins         VTE prophylaxis: SCDs/TEDs and pharmacologic prophylaxis contraindicated due to GI Bleed

## 2023-05-11 NOTE — OR NURSING
1010- Pt to PACU from Endo. Report from anesthesia and Endo RN. On 6L O2 via mask. Respirations even and unlabored. VSS.      1020- Pt awake. Declines pain and nausea at this time. Awaiting bed assignment.    1150- Pt up to bathroom via gurney. Able to void without difficulty.    1219- Ok per Dr. Joy for patient to resume pre-op diet as tolerated    1258- PT tolerating sips of water    1338 - Report to bedside RN.  Pt taken back to floor with RN escort on monitor.  VSS, has all belongings with him.

## 2023-05-11 NOTE — OP REPORT
OPERATIVE REPORT    PATIENT:   Sonny Fitch   1970       PREOPERATIVE DIAGNOSES/INDICATIONS: Hematemesis    POSTOPERATIVE DIAGNOSIS: 1.  There was evidence of LA grade B esophagitis at the GE junction but no esophageal varices or any other pathology.  2.  Diffuse erythema, edema and erosive changes with old blood in the stomach suggestive of erosive gastritis.  Possibly alcohol-related.  No active bleeding noted.  No distinct bleeding site that needed to be hemostased.  3.  Normal duodenum.    PROCEDURE:  ESOPHAGOGASTRODUODENOSCOPY    PHYSICIAN:  Kee Amin MD    ANESTHESIA:  Per anesthesiologist.    LOCATION: [facility]    CONSENT:  OBTAINED. The risks, benefits and alternatives of the procedure were discussed in details. The risks include and are not limited to bleeding, infection, perforation, missed lesions, and sedations risks (cardiopulmonary compromise and allergic reaction to medications).    DESCRIPTION: The patient presented to the procedure room.  After routine checkup was performed, patient was brought into the endoscopy suite.  Patient was placed on his left lateral decubitus position. A bite block was placed in patient's mouth. Patient was sedated by anesthesia.  Vital signs were monitored throughout the procedure.  Oxygenation support was provided throughout the procedure. Upper endoscope was inserted into patient's mouth and advanced to the second portion of the duodenum under direct visualization.  Once the site was reached and examined, the upper endoscope was withdrawn.  Retroflexion was made within the stomach.  The stomach was decompressed, scope was withdrawn and the procedure was terminated.     The patient tolerated the procedure well.  There were no immediate complications.      RECOMMENDATIONS:  1.  Alcohol cessation.  2.  Twice daily PPI.  3.  No further inpatient intervention needed at this time.  Okay to discharge from GI standpoint if tolerating a diet.    This note has been  transcribed with digital voice recognition software and although it has been reviewed may contain grammatical or word errors

## 2023-05-11 NOTE — PROGRESS NOTES
4 Eyes Skin Assessment Completed by MONTANA bustamante and MONTANA montoya.    Head WDL  Ears WDL  Nose WDL  Mouth WDL  Neck WDL  Breast/Chest WDL  Shoulder Blades WDL  Spine Redness/scab  (R) Arm/Elbow/Hand WDL  (L) Arm/Elbow/Hand WDL  Abdomen WDL  Groin WDL  Scrotum/Coccyx/Buttocks WDL  (R) Leg WDL  (L) Leg Scar/scab  (R) Heel/Foot/Toe WDL  (L) Heel/Foot/Toe WDL          Devices In Places Tele Box      Interventions In Place Pillows    Possible Skin Injury No    Pictures Uploaded Into Epic N/A  Wound Consult Placed N/A  RN Wound Prevention Protocol Ordered No

## 2023-05-11 NOTE — ASSESSMENT & PLAN NOTE
No intervention required continue with alcohol cessation continue with omeprazole 20 mg twice daily for 6 weeks.

## 2023-05-11 NOTE — PROGRESS NOTES
Pt is oriented x 4 without complaints.  Still having bloody stools however, they are lighter per pt.  Call light within reach.  Family at bedside.

## 2023-05-11 NOTE — ANESTHESIA POSTPROCEDURE EVALUATION
Patient: Sonny Fitch    Procedure Summary     Date: 05/11/23 Room / Location: [facility] ROOM 26 / SURGERY SAME DAY [facility]    Anesthesia Start: 0955 Anesthesia Stop: 1010    Procedure: GASTROSCOPY (Esophagus) Diagnosis: (gastritis, esophagitis)    Surgeons: Kee Amin M.D. Responsible Provider: Erin Cruz M.D.    Anesthesia Type: MAC ASA Status: 2          Final Anesthesia Type: MAC  Last vitals  BP   Blood Pressure: 91/52    Temp   36.6 °C (97.8 °F)    Pulse   86   Resp   17    SpO2   95 %      Anesthesia Post Evaluation    Patient location during evaluation: PACU  Patient participation: complete - patient participated  Level of consciousness: awake and alert    Airway patency: patent  Anesthetic complications: no  Cardiovascular status: hemodynamically stable  Respiratory status: acceptable  Hydration status: euvolemic    PONV: none          No notable events documented.     Nurse Pain Score: 0 (NPRS)

## 2023-05-11 NOTE — ANESTHESIA TIME REPORT
Anesthesia Start and Stop Event Times     Date Time Event    5/11/2023 0947 Ready for Procedure     0955 Anesthesia Start     1010 Anesthesia Stop        Responsible Staff  05/11/23    Name Role Begin End    Erin Cruz M.D. Anesth 0955 1010        Overtime Reason:  no overtime (within assigned shift)    Comments:

## 2023-05-11 NOTE — ASSESSMENT & PLAN NOTE
Patient status post upper endoscopy evidence of erosive gastritis with evidence of old blood status post bleed in addition to distal esophagitis at GE junction.  No hemostasis was required no evidence of active bleeding.  Patient continue on omeprazole 20 mg twice daily for 60 days.  Alcohol cessation ibuprofen cessation ibuprofen

## 2023-05-11 NOTE — ED NOTES
Bedside report rec'd from MONTANA Fox.  RA, IV medications infusing by IV pump, octreotide and Protonix.  Pt resting in gurney on his side, no distress noted. Pt on monitor.  Call light in reach.

## 2023-05-11 NOTE — ED NOTES
Rounded on patient, sleeping at this time, no distress noted. Vitals stable, fall precautions in place, call light within reach.

## 2023-05-11 NOTE — HOSPITAL COURSE
Sonny Fitch is a 52 y.o. male past medical history of lower back pain, alcohol abuse who presented 5/10/2023 with vomiting blood at outside facility and episode of black tarry stool that he noticed this morning.  Patient reports he drinks half a pint of Captrenny Nielsen every day.  He reports never having bloody vomiting in the past.  He does report using ibuprofen 2-3 times weekly for chronic lower back pain as opiates make him itchy.  At outside facility he was given 2 units of PRBCs, started on Protonix and octreotide.  Patient transferred here for higher level of care.     In the ER at [facility] his blood pressure remained hemodynamically stable.  Repeat hemoglobin returned at 15.  Currently no further episodes of hematemesis or black tarry stools.  GI was consulted by ER physician recommended endoscopy in a.m.  He will be admitted to medicine service.

## 2023-05-11 NOTE — ED PROVIDER NOTES
ED Provider Note    CHIEF COMPLAINT  Chief Complaint   Patient presents with    Upper GI Bleed     Patient to [facility] via ems from [facility], diagnosed with upper gi bleed txa, 2PRBC, 80mg protonix, 1g rocephin. Patient presented to [facility] for dizziness and 2 days blood in vomit. Patient was found to be hypotensive 60/40s and proceeded to vomit 1000mls lyssa red blood.        EXTERNAL RECORDS REVIEWED  Other reviewed the notes from the outside facility.  His hemoglobin was over 15, normal INR and normal platelets.  He was given an octreotide bolus and a pantoprazole bolus.    HPI/ROS  LIMITATION TO HISTORY   Select: : None  OUTSIDE HISTORIAN(S):  EMS report included below    Sonny Fitch is a 52 y.o. male who presents as a transfer from [facility] for concern for upper GI bleed.  Patient reports melena for weeks and then 2 days of hematemesis.  He also had a large episode of hematemesis witnessed in the emergency department out the outside ER (1000cc lyssa blood per EMS).  He was transfused 2 units of PRBCs, IV fluids, 80 mg of Protonix and Rocephin.  He was also given TXA prior to arrival.  He does have a history of daily alcohol use.  He says that he takes 2 shots every morning.  He denies any history of alcohol withdrawal.  He denies any known history of cirrhosis.  He denies any fevers, chills or recent illness.  He did feel dizzy but not currently . No episodes of syncope.    I discussed this patient with his provider at [facility] prior to arrival.    PAST MEDICAL HISTORY   has a past medical history of Hyperlipidemia.    SURGICAL HISTORY   has a past surgical history that includes irrigation & debridement ortho (7/14/08); external fixator application (7/14/08); and orif, fracture, tibia (7/15/08).    FAMILY HISTORY  History reviewed. No pertinent family history.    SOCIAL HISTORY  Social History     Tobacco Use    Smoking status: Every Day     Packs/day: 1.00     Types: Cigarettes    Smokeless  "tobacco: Never   Substance and Sexual Activity    Alcohol use: Yes     Alcohol/week: 9.0 oz     Types: 15 Shots of liquor per week    Drug use: Never    Sexual activity: Not on file       CURRENT MEDICATIONS  Home Medications       Reviewed by Tonny Vidal (Pharmacy Tech) on 05/11/23 at 0148  Med List Status: Unable to Obtain     Medication Last Dose Status        Patient Kj Taking any Medications                           ALLERGIES  Allergies   Allergen Reactions    Morphine Itching    Opium Itching       PHYSICAL EXAM  VITAL SIGNS: BP 95/53   Pulse 83   Temp 36.8 °C (98.3 °F) (Temporal)   Resp 14   Ht 1.803 m (5' 11\")   Wt 109 kg (240 lb)   SpO2 92%   BMI 33.47 kg/m²    Constitutional: Awake and alert . Non toxic  HENT: Normal inspection    Eyes: Normal inspection  Neck: Grossly normal range of motion.  Cardiovascular: Normal heart rate, Normal rhythm.  Symmetric peripheral pulses.   Thorax & Lungs: No respiratory distress, No wheezing, No rales, No rhonchi, No chest tenderness.   Abdomen: Soft, non-distended, nontender to palpation in all 4 quadrants, no mass  Skin: No obvious rash.  Extremities: Warm, well perfused. No clubbing, cyanosis, edema   Neurologic: Grossly normal   Psychiatric: Normal for situation      DIAGNOSTIC STUDIES / PROCEDURES      LABS  Results for orders placed or performed during the hospital encounter of 05/10/23   CBC WITH DIFFERENTIAL   Result Value Ref Range    WBC 11.8 (H) 4.8 - 10.8 K/uL    RBC 4.71 4.70 - 6.10 M/uL    Hemoglobin 15.4 14.0 - 18.0 g/dL    Hematocrit 45.7 42.0 - 52.0 %    MCV 97.0 81.4 - 97.8 fL    MCH 32.7 27.0 - 33.0 pg    MCHC 33.7 33.7 - 35.3 g/dL    RDW 49.3 35.9 - 50.0 fL    Platelet Count 162 (L) 164 - 446 K/uL    MPV 9.8 9.0 - 12.9 fL    Neutrophils-Polys 66.20 44.00 - 72.00 %    Lymphocytes 27.10 22.00 - 41.00 %    Monocytes 5.70 0.00 - 13.40 %    Eosinophils 0.30 0.00 - 6.90 %    Basophils 0.30 0.00 - 1.80 %    Immature Granulocytes 0.40 0.00 " - 0.90 %    Nucleated RBC 0.00 /100 WBC    Neutrophils (Absolute) 7.82 (H) 1.82 - 7.42 K/uL    Lymphs (Absolute) 3.20 1.00 - 4.80 K/uL    Monos (Absolute) 0.67 0.00 - 0.85 K/uL    Eos (Absolute) 0.04 0.00 - 0.51 K/uL    Baso (Absolute) 0.03 0.00 - 0.12 K/uL    Immature Granulocytes (abs) 0.05 0.00 - 0.11 K/uL    NRBC (Absolute) 0.00 K/uL   COMP METABOLIC PANEL   Result Value Ref Range    Sodium 141 135 - 145 mmol/L    Potassium 5.3 3.6 - 5.5 mmol/L    Chloride 111 96 - 112 mmol/L    Co2 20 20 - 33 mmol/L    Anion Gap 10.0 7.0 - 16.0    Glucose 108 (H) 65 - 99 mg/dL    Bun 27 (H) 8 - 22 mg/dL    Creatinine 0.79 0.50 - 1.40 mg/dL    Calcium 8.6 8.5 - 10.5 mg/dL    AST(SGOT) 14 12 - 45 U/L    ALT(SGPT) 16 2 - 50 U/L    Alkaline Phosphatase 65 30 - 99 U/L    Total Bilirubin 0.6 0.1 - 1.5 mg/dL    Albumin 3.9 3.2 - 4.9 g/dL    Total Protein 6.2 6.0 - 8.2 g/dL    Globulin 2.3 1.9 - 3.5 g/dL    A-G Ratio 1.7 g/dL   TROPONIN   Result Value Ref Range    Troponin T 8 6 - 19 ng/L   LACTIC ACID   Result Value Ref Range    Lactic Acid 1.8 0.5 - 2.0 mmol/L   URINALYSIS (UA)    Specimen: Urine   Result Value Ref Range    Color Yellow     Character Clear     Specific Gravity 1.024 <1.035    Ph 5.5 5.0 - 8.0    Glucose Negative Negative mg/dL    Ketones 15 (A) Negative mg/dL    Protein Negative Negative mg/dL    Bilirubin Negative Negative    Urobilinogen, Urine 0.2 Negative    Nitrite Negative Negative    Leukocyte Esterase Negative Negative    Occult Blood Negative Negative    Micro Urine Req see below    PROTHROMBIN TIME (INR)   Result Value Ref Range    PT 14.1 12.0 - 14.6 sec    INR 1.10 0.87 - 1.13   CORRECTED CALCIUM   Result Value Ref Range    Correct Calcium 8.7 8.5 - 10.5 mg/dL   ESTIMATED GFR   Result Value Ref Range    GFR (CKD-EPI) 106 >60 mL/min/1.73 m 2         COURSE & MEDICAL DECISION MAKING    ED Observation Status? No; Patient does not meet criteria for ED Observation.     INITIAL ASSESSMENT, COURSE AND PLAN  Care  Narrative: Patient is a 52 y.o. male who presents with hematemesis.    Patient arrives hypotensive with systolic blood pressures in the 80s, heart rate in 90s, mentating well.  Labs notable for hemoglobin 15.4, platelets 162, INR 1.1.  Lactate is normal.  Troponin normal.  Patient without abdominal pain to suggest mesenteric ischemia or other acute intraabdominal process (e.g. bowel obstruction, diverticulitis, appendicitis etc.)  to warrant advanced imaging.   No history of liver disease though he does drink daily.    Patient already received 2 units of PRBCs prior to arrival.  He was started on an octreotide and pantoprazole drip here in the ER.      10:02 PM  I consulted Dr. Amin with Gastroenterology.  Patient agrees with admission to the hospital with likely plan for endoscopy in the morning given currently clinically stable.  No indication for emergent intervention.    Patient did remain stable during his time in the ER and did not require further blood product resuscitation.    Critical care time : Sonny presents with an acute critical illness and in my judgement had significant potential for imminent deterioration. I provided 35 minutes of Critical Care time to this patient, exclusive of any separately billable procedures. This included bedside direction of care, frequent re-evaluations, time spent coordinating ongoing consultant care and time of medical documentation.            DISPOSITION AND DISCUSSIONS  I have discussed management of the patient with the following physicians and AGUILA's:  Dr. Amin    Discussion of management with other [facility] or appropriate source(s): Pharmacy Regarding Protonix and Ocreotide        FINAL DIAGNOSIS  1. Upper GI bleed Acute   2. Alcohol abuse Acute   3. Hematemesis, unspecified whether nausea present Acute          Electronically signed by: Mee Ramirez M.D., 5/10/2023 9:48 PM

## 2023-05-11 NOTE — ED NOTES
Bedside report given to Sheila BOYLE, discussed orders, poc, patient status and vitals. Removed self from care of patient.

## 2023-05-11 NOTE — ASSESSMENT & PLAN NOTE
Reports never being hospitalized for Etoh withdrawal   Check magnesium   Thiamine, folic acid and multivitamins

## 2023-05-11 NOTE — CONSULTS
"..Date of Consultation:  5/11/2023    Patient: : Sonny Fitch  MRN: [id number]    Referring Physician: Dr. Néstor Perez     GI:CINTHIA Yousif     Reason for Consultation: GI bleeding    History of Present Illness: Sonny Almonte is a 52-year-old male with PMH significant for asthma who was transferred from [facility] via EMS secondary to upper GI bleeding.  Patient received 2 units PRBC, 80 mg of Protonix, and 1 g of Rocephin at outside facility.  Patient was dizzy and vomiting blood, 1 episode of melena, and was also found to be hypotensive.  Hemoglobin has remained stable, last 14.7 with BUN 27.    Reports that he went to the ER because he was feeling dizzy.  He was found to be hypotensive and then vomited blood after that, he did not have nausea prior.  He denies abdominal pain, GERD symptoms, dysphagia, or hematochezia.  He also reported 1 episode of melena.  He drinks 1/2 pint a day and has been a \"alcoholic all my life\".  He reports he used to drink more but has slowed down some.  He smokes 15 cigarettes a day and does not do any drugs.  He also endorses a deliberate weight loss of 35 pounds over the past year.  Has never had a colonoscopy or an endoscopy.        Past Medical History:   Diagnosis Date    Hyperlipidemia          Past Surgical History:   Procedure Laterality Date    ORIF, FRACTURE, TIBIA  7/15/08    Performed by JAVIER ALMANZA at SURGERY [facility] ORS    IRRIGATION & DEBRIDEMENT ORTHO  7/14/08    Performed by JAVIER ALMANZA at SURGERY [facility] ORS    EXTERNAL FIXATOR APPLICATION  7/14/08    Performed by JAVIER ALMANZA at SURGERY [facility] ORS       History reviewed. No pertinent family history.    Social History     Socioeconomic History    Marital status:    Tobacco Use    Smoking status: Every Day     Packs/day: 1.00     Types: Cigarettes    Smokeless tobacco: Never   Substance and Sexual Activity    Alcohol use: Yes     Alcohol/week: 9.0 oz     " Types: 15 Shots of liquor per week    Drug use: Never       Review of systems:  Review of Systems   Constitutional:  Negative for chills, fever and malaise/fatigue.   HENT:  Negative for hearing loss.    Eyes:  Negative for blurred vision.   Respiratory:  Negative for cough and shortness of breath.    Cardiovascular:  Negative for chest pain and leg swelling.   Gastrointestinal:  Positive for melena and vomiting. Negative for abdominal pain, blood in stool, constipation, diarrhea, heartburn and nausea.   Genitourinary:  Positive for frequency. Negative for dysuria.   Musculoskeletal:  Negative for back pain.   Skin:  Negative for rash.   Neurological:  Negative for dizziness and weakness.   Psychiatric/Behavioral:  Positive for substance abuse. Negative for depression.    All other systems reviewed and are negative.        Physical Exam:  Vitals:    05/11/23 0332 05/11/23 0402 05/11/23 0509 05/11/23 0609   BP:  91/52 105/63 106/57   Pulse:  86 74 89   Resp: 20   (!) 11   Temp:       TempSrc:       SpO2:  91% 94% 97%   Weight:       Height:           Physical Exam  Vitals and nursing note reviewed.   Constitutional:       General: He is not in acute distress.     Appearance: Normal appearance. He is normal weight.   HENT:      Head: Normocephalic and atraumatic.      Right Ear: External ear normal.      Left Ear: External ear normal.      Nose: Nose normal.      Mouth/Throat:      Mouth: Mucous membranes are moist.      Pharynx: Oropharynx is clear.   Eyes:      General: No scleral icterus.  Cardiovascular:      Rate and Rhythm: Normal rate and regular rhythm.      Pulses: Normal pulses.      Heart sounds: Normal heart sounds.   Pulmonary:      Effort: Pulmonary effort is normal. No respiratory distress.      Breath sounds: Normal breath sounds.   Abdominal:      General: Abdomen is flat. Bowel sounds are normal. There is no distension.      Palpations: Abdomen is soft.      Tenderness: There is no abdominal  tenderness.   Musculoskeletal:         General: Normal range of motion.      Cervical back: Normal range of motion.   Skin:     General: Skin is warm and dry.      Capillary Refill: Capillary refill takes less than 2 seconds.   Neurological:      Mental Status: He is alert and oriented to person, place, and time.   Psychiatric:         Mood and Affect: Mood normal.         Behavior: Behavior normal.           Labs:  Recent Labs     05/10/23  2147 05/11/23  0521   WBC 11.8*  --    RBC 4.71  --    HEMOGLOBIN 15.4 14.7   HEMATOCRIT 45.7  --    MCV 97.0  --    MCH 32.7  --    MCHC 33.7  --    RDW 49.3  --    PLATELETCT 162*  --    MPV 9.8  --      Recent Labs     05/10/23  2147   SODIUM 141   POTASSIUM 5.3   CHLORIDE 111   CO2 20   GLUCOSE 108*   BUN 27*     Recent Labs     05/10/23  2147   INR 1.10       Recent Labs     05/10/23  2147   ASTSGOT 14   ALTSGPT 16   TBILIRUBIN 0.6   ALKPHOSPHAT 65   GLOBULIN 2.3   INR 1.10         Imaging:  DX-PORTABLE FLUORO > 1 HOUR  Narrative: EXAMINATION: RAD  9010 PORTABLE FLUORO > 1 HOUR    [id number] CPT-4:   89036    HISTORY/REASON FOR EXAM:     Evaluate fracture.    TECHNIQUE/EXAM DESCRIPTION AND NUMBER OF VIEWS:    Portable fluoroscopy   for greater than one hour in OR, July 15, 2008.    FINDINGS:       The portable fluoroscopy unit was obligated to the   procedure for greater than one hour.  Actual fluoro time was 1 minutes 48   seconds.    Impression: IMPRESSION:    PORTABLE FLUOROSCOPY UTILIZED FOR 1 MINUTES 48 SECONDS.  DX-TIBIA AND FIBULA  Narrative: EXAMINATION: RAD  7306 TIBIA AND FIBULA  LEFT  [id number] CPT-4:   86200    HISTORY/REASON FOR EXAM:     Evaluate fracture.    TECHNIQUE/EXAM DESCRIPTION AND NUMBER OF VIEWS:    Four fluoroscopic   images of the left tibia and fibula, July 15, 2008.    COMPARISON:    Previous study of 07/13/08.    FINDINGS:     Please note the images labeled on PACS are labeled 7/14/08   which is the incorrect date.  It should read  7/15/08.    There are continuous skin staples medially.  There is a sideplate with   multiple screws stabilizing an extensively comminuted fracture of the mid   to distal left tibia.  There is also a comminuted fracture of the distal   fibula noted.  Extensive plate and screw fixation is also seen projecting   over the calcaneus.  Impression: IMPRESSION:     1. OPEN REDUCTION INTERNAL FIXATION DISTAL TIBIAL FRACTURE AND CALCANEAL   FRACTURE.    2. DISTAL FIBULAR FRACTURE ALSO NOTED.    JAL/lawson    Read By TY OWENS MD on Jul 15 2008  7:37PM  : LAWSON Transcription Date: Jul 16 2008  9:43AM  THIS DOCUMENT HAS BEEN ELECTRONICALLY SIGNED BY: TY OWENS MD on   Jul 16 2008  9:59AM       Impressions:  Acute gastrointestinal hemorrhage  Hematemesis  Melena  Alcohol misuse disorder  Asthma    MDM:  Patient is a 52-year-old male with past medical history significant for asthma and lung alcohol misuse disorder who presents to the ED with dizziness and found to be hypotensive and thereafter had 1 episode of hematemesis and melena.  Hemoglobin is stable at 14.7 with elevated BUN of 27.  He is agreeable to proceed with EGD today.    Recommendations:  IV PPI twice daily  Octreotide drip  Keep n.p.o.  EGD today  Further recommendations to follow postprocedure      This note was generated using voice recognition software which has a small chance of producing errors of grammar and possibly content. I have made every reasonable attempt to find and correct any obvious errors, but expect that some may not be found prior to finalization of this note.

## 2023-05-11 NOTE — ASSESSMENT & PLAN NOTE
Patient had evidence of erosive gastritis in addition to diffuse erythema on the gastric surface however no evidence of active bleeding no hemostasis was required.  Evidence of old blood was present.  Duodenum complete normal.  Plan:  1.  Omeprazole 20 mg twice daily for 60 days  2.  Continue to avoid alcohol.

## 2023-05-11 NOTE — ED NOTES
Pt ambulated to restroom with steady gait, no c/o dizziness.  Dr ordered dose of ativan.  Pt back to room from restroom.  Report given to Pre-op RN - plan for EGD.   Pt refusing ativan, reports that he wants to talk with DR prior to medication.  Pre op RN informed.  Pt to [facility] preop - all belongings with pt.   RN with pt on monitor.

## 2023-05-12 NOTE — CARE PLAN
Problem: Knowledge Deficit - Standard  Goal: Patient and family/care givers will demonstrate understanding of plan of care, disease process/condition, diagnostic tests and medications  Outcome: Progressing     Problem: Optimal Care for Alcohol Withdrawal  Goal: Optimal Care for the alcohol withdrawal patient  Outcome: Progressing   The patient is Stable - Low risk of patient condition declining or worsening    Shift Goals  Clinical Goals: decreased bloody stool  Patient Goals: decreased bloody stool    Progress made toward(s) clinical / shift goals:  pt updated on POC for today.  CIWA assessments in place    Patient is not progressing towards the following goals:

## 2023-05-12 NOTE — DISCHARGE SUMMARY
Discharge Summary    CHIEF COMPLAINT ON ADMISSION  Chief Complaint   Patient presents with    Upper GI Bleed     [facility], diagnosed with upper gi bleed txa, 2PRBC, 80mg protonix, 1g rocephin. Patient presented to [place] for dizziness and 2 days blood in vomit. Patient was found to be hypotensive 60/40s and proceeded to vomit 1000mls lyssa red blood.        Reason for Admission  ems     Admission Date  5/10/2023    CODE STATUS  Full Code    HPI & HOSPITAL COURSE  This is a 52 y.o. male here with GI bleed  Sonny Fitch is a 52 y.o. male past medical history of lower back pain, alcohol abuse who presented 5/10/2023 with vomiting blood at outside facility and episode of black tarry stool that he noticed this morning.  Patient reports he drinks half a pint of Captrenny Nielsen every day.  He reports never having bloody vomiting in the past.  He does report using ibuprofen 2-3 times weekly for chronic lower back pain as opiates make him itchy.  At outside facility he was given 2 units of PRBCs, started on Protonix and octreotide.  Patient transferred here for higher level of care.     In the ER at [facility] his blood pressure remained hemodynamically stable.  Repeat hemoglobin returned at 15.  Currently no further episodes of hematemesis or black tarry stools.  GI was consulted by ER physician recommended endoscopy in a.m.  He will be admitted to medicine service.  Patient went for upper endoscopy today was found to have erosive esophagitis at the GE junction no evidence of esophageal varices or any other pathology.  Furthermore patient had diffuse erythema and edema of the gastric lining in addition to erosive changes with old blood highly suggestive of erosive gastritis.  No evidence of active bleeding no intervention was performed or needed.  Patient's had stabilized hemoglobin levels patient tolerated diet following procedure.  Patient recovered well in the  postoperative period discussed with patient in detail further hospitalization patient was adamant that he wished to leave.  Counseled patient that I would at least want him to have medications in the form of omeprazole 20 mg twice daily in hand prior to leaving.  Furthermore, I counseled patient that while his testing showed no evidence of active bleeding if he did not continue to this stop drinking alcohol he is at significantly increased risk of morbidity mortality and up to including death.  Patient endorsed understanding with teach back.  Necessary medications were provided patient in hand prior to discharge.      Therefore, he is discharged in good and stable condition to home with close outpatient follow-up.    The patient recovered much more quickly than anticipated on admission.    Discharge Date  5/11/2023    FOLLOW UP ITEMS POST DISCHARGE  Take all medications as prescribed  Go to all follow-up appointments as indicated  Continue to not drink alcohol  If your symptoms suddenly return or worsen go to the nearest emergency department.    DISCHARGE DIAGNOSES  Principal Problem:    Upper GI bleed (POA: Yes)  Active Problems:    Alcohol abuse (POA: Unknown)    Obesity (BMI 30.0-34.9) (POA: Unknown)    Erosive gastritis with hemorrhage (POA: Unknown)    Erosive esophagitis (POA: Unknown)  Resolved Problems:    * No resolved hospital problems. *      FOLLOW UP    [facility]  [address] [place] [address]  [phone]  Go to  As needed      MEDICATIONS ON DISCHARGE     Medication List        START taking these medications        Instructions   omeprazole 20 MG delayed-release capsule  Start taking on: May 12, 2023  Commonly known as: PRILOSEC   Take 1 Capsule by mouth 2 times a day for 60 days.  Dose: 20 mg            STOP taking these medications      atorvastatin 20 MG Tabs  Commonly known as: LIPITOR              Allergies  Allergies    Allergen Reactions    Morphine Itching    Opium Itching       DIET  Orders Placed This Encounter   Procedures    Diet Order Diet: Low Fiber(GI Soft)     Standing Status:   Standing     Number of Occurrences:   1     Order Specific Question:   Diet:     Answer:   Low Fiber(GI Soft) [2]       ACTIVITY  As tolerated.  Weight bearing as tolerated    CONSULTATIONS  Neurology    PROCEDURES  Upper endoscopy 5/11/2023    Evidence of LA grade B esophagitis at the GE junction but no esophageal varices or any other pathology.    Diffuse erythema, edema and erosive changes with old blood in the stomach suggestive of erosive gastritis.  Possibly alcohol-related.  No active bleeding noted.  No distinct bleeding site that needed to be hemostased      LABORATORY  Lab Results   Component Value Date    SODIUM 141 05/10/2023    POTASSIUM 5.3 05/10/2023    CHLORIDE 111 05/10/2023    CO2 20 05/10/2023    GLUCOSE 108 (H) 05/10/2023    BUN 27 (H) 05/10/2023    CREATININE 0.79 05/10/2023    CREATININE 1.0 07/16/2008        Lab Results   Component Value Date    WBC 11.8 (H) 05/10/2023    HEMOGLOBIN 15.3 05/11/2023    HEMATOCRIT 45.7 05/10/2023    PLATELETCT 162 (L) 05/10/2023      Please note that this dictation was created using voice recognition software. I have made every reasonable attempt to correct obvious errors, but I expect that there are errors of grammar and possibly context that I did not discover before finalizing the note.    Total time of the discharge process exceeds 35 minutes.

## (undated) DEVICE — CANNULA O2 COMFORT SOFT EAR ADULT 7 FT TUBING (50/CA)

## (undated) DEVICE — CONTAINER, SPECIMEN, STERILE

## (undated) DEVICE — FILM CASSETTE ENDO

## (undated) DEVICE — SET EXTENSION WITH 2 PORTS (48EA/CA) ***PART #2C8610 IS A SUBSTITUTE*****

## (undated) DEVICE — TOWEL STOP TIMEOUT SAFETY FLAG (40EA/CA)

## (undated) DEVICE — NEPTUNE 4 PORT MANIFOLD - (20/PK)

## (undated) DEVICE — CATHETER IV 20 GA X 1-1/4 ---SURG.& SDS ONLY--- (50EA/BX)

## (undated) DEVICE — SET LEADWIRE 5 LEAD BEDSIDE DISPOSABLE ECG (1SET OF 5/EA)

## (undated) DEVICE — BITE BLOCK, DISP.

## (undated) DEVICE — ELECTRODE 850 FOAM ADHESIVE - HYDROGEL RADIOTRNSPRNT (50/PK)

## (undated) DEVICE — KIT CUSTOM PROCEDURE SINGLE FOR ENDO  (15/CA)

## (undated) DEVICE — CANISTER SUCTION RIGID RED 1500CC (40EA/CA)

## (undated) DEVICE — TUBE CONNECTING SUCTION - CLEAR PLASTIC STERILE 72 IN (50EA/CA)